# Patient Record
Sex: MALE | Race: WHITE | URBAN - METROPOLITAN AREA
[De-identification: names, ages, dates, MRNs, and addresses within clinical notes are randomized per-mention and may not be internally consistent; named-entity substitution may affect disease eponyms.]

---

## 2017-10-02 ENCOUNTER — OFFICE VISIT (OUTPATIENT)
Dept: ORTHOPEDIC SURGERY | Age: 22
End: 2017-10-02

## 2017-10-02 ENCOUNTER — TELEPHONE (OUTPATIENT)
Dept: ORTHOPEDIC SURGERY | Age: 22
End: 2017-10-02

## 2017-10-02 VITALS
BODY MASS INDEX: 27.77 KG/M2 | HEART RATE: 78 BPM | WEIGHT: 205 LBS | DIASTOLIC BLOOD PRESSURE: 69 MMHG | SYSTOLIC BLOOD PRESSURE: 117 MMHG | HEIGHT: 72 IN

## 2017-10-02 DIAGNOSIS — M25.562 LEFT KNEE PAIN, UNSPECIFIED CHRONICITY: ICD-10-CM

## 2017-10-02 DIAGNOSIS — M25.361 PATELLAR INSTABILITY OF RIGHT KNEE: Primary | ICD-10-CM

## 2017-10-02 PROCEDURE — 99204 OFFICE O/P NEW MOD 45 MIN: CPT | Performed by: ORTHOPAEDIC SURGERY

## 2017-10-02 PROCEDURE — L1830 KO IMMOB CANVAS LONG PRE OTS: HCPCS | Performed by: ORTHOPAEDIC SURGERY

## 2017-10-02 NOTE — PROGRESS NOTES
Review of Systems   Musculoskeletal: Positive for joint pain. Left knee pain    All other systems reviewed and are negative.

## 2017-10-02 NOTE — MR AVS SNAPSHOT
We Ordered/Performed the following           DJO Knee Immobilizer     Comments:    Patient was prescribed a DJO Knee Immobilizer. The left knee will require stabilization / immobilization from this semi-rigid / rigid orthosis to improve their function. The orthosis will assist in protecting the affected area, provide functional support and facilitate healing. The prefabricated orthosis was modified in the following manner to provide a customizable fit for the patient at the time of delivery. 1. Identification of appropriate positioning and alignment of anatomical landmarks. 2. Trimming of straps and panels. Reassemble orthosis to specifically fit patient. The patient was educated and fit by a healthcare professional with expert knowledge and specialization in brace application while under the direct supervision of the treating physician. Verbal and written instructions for the use of and application of this item were provided. They were instructed to contact the office immediately should the brace result in increased pain, decreased sensation, increased swelling or worsening of the condition. MRI Knee Left WO Contrast     XR Knee Bilateral Standard     XR KNEE LEFT (1-2 VIEWS)     Comments:    [id number]         Result Summary for XR KNEE LEFT (1-2 VIEWS)      Result Information     Status          Final result (Exam End: 10/2/2017  3:00 PM)           10/2/2017  3:00 PM      Narrative & Impression           Radiology exam is complete. No Radiologist dictation. Please follow up with ordering provider. Result Summary for XR Knee Bilateral Standard      Result Information     Status          Final result (Exam End: 10/2/2017  3:00 PM)           10/2/2017  3:00 PM      Narrative & Impression           Radiology exam is complete. No Radiologist dictation. Please follow up with ordering provider.                         Additional Information        Basic Information Date Of Birth Sex Race Ethnicity Preferred Language    1995 Male White Non-/Non  English      Preventive Care        Date Due    HIV screening is recommended for all people regardless of risk factors  aged 15-65 years at least once (lifetime) who have never been HIV tested. 10/8/2008    Tetanus Combination Vaccine (1 - Tdap) 10/8/2012    Yearly Flu Vaccine (1) 9/1/2017            MyChart Signup           Our records indicate that you have declined MyChart signup.

## 2017-10-02 NOTE — PROGRESS NOTES
68-year-old male seen for evaluation of left knee injury. Patient jumped off backless truck yesterday and felt his patella dislocated. He's had multiple dislocations in the past.  He also reports difficulty extending his leg has a lot To extension be able to weight-bear. Comes in today for evaluation. He's had surgeries in the past which sounds as though they were medial retinacular tightening procedures. Pain Assessment  Location of Pain: Knee  Location Modifiers: Left  Severity of Pain: 6  Quality of Pain: Sharp, Aching  Duration of Pain: Persistent  Frequency of Pain: Intermittent  Aggravating Factors:  (any movement )  Limiting Behavior: Yes  Relieving Factors: Rest  Result of Injury: Yes  Work-Related Injury: No  Are there other pain locations you wish to document?: No    No past medical history on file. Past Surgical History:   Procedure Laterality Date    KNEE ARTHROSCOPY Bilateral        No family history on file. Social History     Social History    Marital status: Single     Spouse name: N/A    Number of children: N/A    Years of education: N/A     Social History Main Topics    Smoking status: Never Smoker    Smokeless tobacco: None    Alcohol use No    Drug use: No    Sexual activity: Not Asked     Other Topics Concern    None     Social History Narrative    None       No current outpatient prescriptions on file. No current facility-administered medications for this visit. No Known Allergies    Vital signs:  /69  Pulse 78  Ht 6' (1.829 m)  Wt 205 lb (93 kg)  BMI 27.8 kg/m2       Neuro: Alert & oriented x 3,  normal,  no focal deficits noted. Normal affect. Eyes: sclera clear  Ears: Normal external ear  Mouth:  No perioral lesions  Pulm: Respirations unlabored and regular  Pulse: Regular rate and rhythm   Skin: Warm, well perfused        Right Knee Exam:       Gait/Alignment: No use of assistive devices. No antalgic gait or limp.   Normal alignment. Patella tracking: Normal tracking identified. No retinacular tenderness. Negative J sign. Inspection/Skin: Skin is intact without erythema, ecchymosis, or swelling. Well-healed transverse medial incision. Effusion; None. Palpation:  Nontender. Mild patella crepitus. Range of Motion:   0° of extension to 125° of flexion. Strength: 5/5 quadriceps strength. Ligamentous Stability: Stable to valgus and varus testing at 0° and 30°. Negative Lachman exam.  Negative posterior drawer. Neurologic and vascular: Skin is warm and well-perfused. Additional findings: Calf soft nontender               Left Knee Exam:       Gait/Alignment: No use of assistive devices. No antalgic gait or limp. Normal alignment. Patella tracking: Normal tracking identified. No retinacular tenderness. Negative J sign. Inspection/Skin: Skin is intact without erythema, ecchymosis. Moderate knee effusion. Well-healed prior medial incision. Effusion; moderate. Palpation:  Nontender. No crepitus. Range of Motion:   0° of extension to 110° of flexion. Strength: Patient unable to activate quad. Lacks active knee extension. Ligamentous Stability: Stable to valgus and varus testing at 0° and 30°. Negative Lachman exam.  Negative posterior drawer. Neurologic and vascular: Skin is warm and well-perfused. Additional findings: Calf soft nontender                 X-rays:    A total of 4 x-rays of the left knee were obtained today. They include bilateral Merchant views, bilateral standing AP and Eder Farrow views and a lateral view of the left knee. I reviewed the films in the office today. Status post prior patella surgery. Good preservation joint space. Normal trochlear groove. Impression: Recurrent left patella instability with possible patella tendon tear. Plan: T ROM brace, crutches, MRI to rule out patella tendon injury.       Greater than 50% of the visit was spent counseling the patient. I personally reviewed the patient's pain scale, review of systems, family history, social history, past medical history, allergies and medications. 13 point review of systems was collected today and is included in the medical record. Margaret Mclain MD  Sports Medicine, Knee and Shoulder Surgery    This dictation was performed with a verbal recognition program [facility]) and it was checked for errors. It is possible that there are still dictated errors within this office note. If so, please bring any errors to my attention for an addendum. All efforts were made to ensure that this office note is accurate.

## 2017-10-05 ENCOUNTER — TELEPHONE (OUTPATIENT)
Dept: ORTHOPEDIC SURGERY | Age: 22
End: 2017-10-05

## 2017-10-12 ENCOUNTER — TELEPHONE (OUTPATIENT)
Dept: ORTHOPEDIC SURGERY | Age: 22
End: 2017-10-12

## 2017-10-26 NOTE — TELEPHONE ENCOUNTER
auth for MRI sent to [facility] per patient request     [facility] has tried to contact patient several times   I left vm to please call back to schedule. Have not heard back from patient.